# Patient Record
Sex: MALE | Race: WHITE | URBAN - METROPOLITAN AREA
[De-identification: names, ages, dates, MRNs, and addresses within clinical notes are randomized per-mention and may not be internally consistent; named-entity substitution may affect disease eponyms.]

---

## 2021-10-05 ENCOUNTER — WEB ENCOUNTER (OUTPATIENT)
Dept: URBAN - METROPOLITAN AREA CLINIC 98 | Facility: CLINIC | Age: 51
End: 2021-10-05

## 2021-10-05 ENCOUNTER — OFFICE VISIT (OUTPATIENT)
Dept: URBAN - METROPOLITAN AREA CLINIC 98 | Facility: CLINIC | Age: 51
End: 2021-10-05
Payer: COMMERCIAL

## 2021-10-05 DIAGNOSIS — R63.4 WEIGHT LOSS: ICD-10-CM

## 2021-10-05 DIAGNOSIS — Z12.11 ENCOUNTER FOR SCREENING FOR MALIGNANT NEOPLASM OF COLON: ICD-10-CM

## 2021-10-05 DIAGNOSIS — K76.0 HEPATIC STEATOSIS: ICD-10-CM

## 2021-10-05 DIAGNOSIS — R19.7 DIARRHEA, UNSPECIFIED TYPE: ICD-10-CM

## 2021-10-05 PROCEDURE — 99204 OFFICE O/P NEW MOD 45 MIN: CPT | Performed by: INTERNAL MEDICINE

## 2021-10-05 RX ORDER — OMEPRAZOLE 40 MG/1
1 CAPSULE 30 MINUTES BEFORE MORNING MEAL CAPSULE, DELAYED RELEASE ORAL ONCE A DAY
Status: ACTIVE | COMMUNITY

## 2021-10-05 RX ORDER — LOSARTAN POTASSIUM 50 MG/1
1 TABLET TABLET ORAL ONCE A DAY
Status: ACTIVE | COMMUNITY

## 2021-10-05 RX ORDER — PANTOPRAZOLE SODIUM 40 MG/1
TABLET, DELAYED RELEASE ORAL
Qty: 0 | Refills: 0 | Status: DISCONTINUED | COMMUNITY
Start: 1900-01-01

## 2021-10-05 NOTE — HPI-TODAY'S VISIT:
last seen by myself in 2016 after [facility] stay for hematemesis. at the time, EGD w severe esophagitis.  US with hepatic steatosis: admitted to heavy etoh use, prior cocaine use.   lost 60 lbs since December.  goes through 2-3 rolls of toilet paper  due to diarrhea.   started 1 yr ago.  testing for COVID negative.   been to PCP but workup negative.   no pain - just "gurgles"  . still w Etoh intake - "Not enough" he says.  daily - beer  still working at bar.   no hematemesis.

## 2021-10-06 LAB
A/G RATIO: 1.3
ALBUMIN: 4.2
ALKALINE PHOSPHATASE: 135
ALT (SGPT): 58
AST (SGOT): 133
BASO (ABSOLUTE): 0.1
BASOS: 1
BILIRUBIN, TOTAL: 0.9
BUN/CREATININE RATIO: 5
BUN: 4
CALCIUM: 9.3
CARBON DIOXIDE, TOTAL: 25
CHLORIDE: 99
CREATININE: 0.82
EGFR IF AFRICN AM: 119
EGFR IF NONAFRICN AM: 103
EOS (ABSOLUTE): 0.2
EOS: 4
GGT: 356
GLOBULIN, TOTAL: 3.3
GLUCOSE: 80
HEMATOCRIT: 43.4
HEMATOLOGY COMMENTS:: (no result)
HEMOGLOBIN: 14.6
IMMATURE CELLS: (no result)
IMMATURE GRANS (ABS): 0
IMMATURE GRANULOCYTES: 0
LYMPHS (ABSOLUTE): 1.1
LYMPHS: 20
MCH: 33.6
MCHC: 33.6
MCV: 100
MONOCYTES(ABSOLUTE): 0.7
MONOCYTES: 12
NEUTROPHILS (ABSOLUTE): 3.6
NEUTROPHILS: 63
NRBC: (no result)
PLATELETS: 232
POTASSIUM: 4.1
PROTEIN, TOTAL: 7.5
RBC: 4.35
RDW: 15
SODIUM: 139
WBC: 5.7

## 2021-10-29 ENCOUNTER — LAB OUTSIDE AN ENCOUNTER (OUTPATIENT)
Dept: URBAN - METROPOLITAN AREA CLINIC 98 | Facility: CLINIC | Age: 51
End: 2021-10-29

## 2021-10-29 ENCOUNTER — TELEPHONE ENCOUNTER (OUTPATIENT)
Dept: URBAN - METROPOLITAN AREA CLINIC 98 | Facility: CLINIC | Age: 51
End: 2021-10-29

## 2021-11-15 ENCOUNTER — TELEPHONE ENCOUNTER (OUTPATIENT)
Dept: URBAN - METROPOLITAN AREA CLINIC 98 | Facility: CLINIC | Age: 51
End: 2021-11-15

## 2021-11-29 ENCOUNTER — OFFICE VISIT (OUTPATIENT)
Dept: URBAN - METROPOLITAN AREA SURGERY CENTER 18 | Facility: SURGERY CENTER | Age: 51
End: 2021-11-29
Payer: COMMERCIAL

## 2021-11-29 ENCOUNTER — CLAIMS CREATED FROM THE CLAIM WINDOW (OUTPATIENT)
Dept: URBAN - METROPOLITAN AREA CLINIC 4 | Facility: CLINIC | Age: 51
End: 2021-11-29
Payer: COMMERCIAL

## 2021-11-29 DIAGNOSIS — D12.8 BENIGN NEOPLASM OF RECTUM: ICD-10-CM

## 2021-11-29 DIAGNOSIS — D12.3 BENIGN NEOPLASM OF TRANSVERSE COLON: ICD-10-CM

## 2021-11-29 DIAGNOSIS — K31.89 DEFORMED PYLORUS, ACQUIRED: ICD-10-CM

## 2021-11-29 DIAGNOSIS — K29.80 ACUTE DUODENITIS: ICD-10-CM

## 2021-11-29 DIAGNOSIS — D12.3 ADENOMA OF TRANSVERSE COLON: ICD-10-CM

## 2021-11-29 DIAGNOSIS — K29.81 DUODENITIS WITH BLEEDING: ICD-10-CM

## 2021-11-29 DIAGNOSIS — D12.5 ADENOMA OF SIGMOID COLON: ICD-10-CM

## 2021-11-29 DIAGNOSIS — K31.89 ACQUIRED DEFORMITY OF DUODENUM: ICD-10-CM

## 2021-11-29 DIAGNOSIS — K22.10 ULCER OF ESOPHAGUS WITHOUT BLEEDING: ICD-10-CM

## 2021-11-29 DIAGNOSIS — K21.9 GASTRO-ESOPHAGEAL REFLUX DISEASE WITHOUT ESOPHAGITIS: ICD-10-CM

## 2021-11-29 DIAGNOSIS — D12.8 ADENOMATOUS POLYP OF RECTUM: ICD-10-CM

## 2021-11-29 DIAGNOSIS — K22.10 BARRETT'S ESOPHAGEAL ULCERATION: ICD-10-CM

## 2021-11-29 DIAGNOSIS — D12.5 BENIGN NEOPLASM OF SIGMOID COLON: ICD-10-CM

## 2021-11-29 PROCEDURE — 45385 COLONOSCOPY W/LESION REMOVAL: CPT | Performed by: INTERNAL MEDICINE

## 2021-11-29 PROCEDURE — 88305 TISSUE EXAM BY PATHOLOGIST: CPT | Performed by: PATHOLOGY

## 2021-11-29 PROCEDURE — 45380 COLONOSCOPY AND BIOPSY: CPT | Performed by: INTERNAL MEDICINE

## 2021-11-29 PROCEDURE — G8907 PT DOC NO EVENTS ON DISCHARG: HCPCS | Performed by: INTERNAL MEDICINE

## 2021-11-29 PROCEDURE — 43239 EGD BIOPSY SINGLE/MULTIPLE: CPT | Performed by: INTERNAL MEDICINE

## 2021-11-29 PROCEDURE — 88342 IMHCHEM/IMCYTCHM 1ST ANTB: CPT | Performed by: PATHOLOGY

## 2021-11-29 PROCEDURE — 88312 SPECIAL STAINS GROUP 1: CPT | Performed by: PATHOLOGY

## 2021-11-29 PROCEDURE — 88341 IMHCHEM/IMCYTCHM EA ADD ANTB: CPT | Performed by: PATHOLOGY

## 2021-11-29 RX ORDER — LOSARTAN POTASSIUM 50 MG/1
1 TABLET TABLET ORAL ONCE A DAY
Status: ACTIVE | COMMUNITY

## 2021-11-29 RX ORDER — OMEPRAZOLE 40 MG/1
1 CAPSULE 30 MINUTES BEFORE MORNING MEAL CAPSULE, DELAYED RELEASE ORAL ONCE A DAY
Status: ACTIVE | COMMUNITY

## 2021-12-08 ENCOUNTER — TELEPHONE ENCOUNTER (OUTPATIENT)
Dept: URBAN - METROPOLITAN AREA CLINIC 98 | Facility: CLINIC | Age: 51
End: 2021-12-08

## 2021-12-08 RX ORDER — OMEPRAZOLE 40 MG/1
1 CAPSULE 30 MINUTES BEFORE MORNING MEAL CAPSULE, DELAYED RELEASE ORAL BID
Qty: 180 | Refills: 1

## 2021-12-08 RX ORDER — SUCRALFATE 1 G
1 TABLET ON AN EMPTY STOMACH TABLET ORAL TWICE A DAY
Qty: 180 TABLET | Refills: 0 | OUTPATIENT
Start: 2021-12-08 | End: 2022-03-08

## 2022-03-14 ENCOUNTER — TELEPHONE ENCOUNTER (OUTPATIENT)
Dept: URBAN - METROPOLITAN AREA CLINIC 98 | Facility: CLINIC | Age: 52
End: 2022-03-14

## 2022-03-17 LAB
A/G RATIO: 1.4
ALBUMIN: 4.1
ALKALINE PHOSPHATASE: 101
ALT (SGPT): 52
AST (SGOT): 58
BASO (ABSOLUTE): 0.1
BASOS: 1
BILIRUBIN, TOTAL: 1.2
BUN/CREATININE RATIO: 9
BUN: 7
CALCIUM: 9.9
CARBON DIOXIDE, TOTAL: 26
CHLORIDE: 98
CREATININE: 0.82
EGFR: 106
EOS (ABSOLUTE): 0.2
EOS: 4
FERRITIN, SERUM: 93
GLOBULIN, TOTAL: 2.9
GLUCOSE: 102
HEMATOCRIT: 43.9
HEMATOLOGY COMMENTS:: (no result)
HEMOGLOBIN: 14.7
IMMATURE CELLS: (no result)
IMMATURE GRANS (ABS): 0
IMMATURE GRANULOCYTES: 0
IRON BIND.CAP.(TIBC): <357
IRON SATURATION: >95
IRON: 340
LYMPHS (ABSOLUTE): 1
LYMPHS: 15
MCH: 32.4
MCHC: 33.5
MCV: 97
MONOCYTES(ABSOLUTE): 0.6
MONOCYTES: 9
NEUTROPHILS (ABSOLUTE): 4.9
NEUTROPHILS: 71
NRBC: (no result)
PLATELETS: 356
POTASSIUM: 4.9
PROTEIN, TOTAL: 7
RBC: 4.54
RDW: 16.9
SODIUM: 139
UIBC: <17
WBC: 6.8

## 2022-03-23 ENCOUNTER — TELEPHONE ENCOUNTER (OUTPATIENT)
Dept: URBAN - METROPOLITAN AREA CLINIC 98 | Facility: CLINIC | Age: 52
End: 2022-03-23

## 2022-04-30 ENCOUNTER — ERX REFILL RESPONSE (OUTPATIENT)
Dept: URBAN - METROPOLITAN AREA CLINIC 98 | Facility: CLINIC | Age: 52
End: 2022-04-30

## 2022-04-30 RX ORDER — SUCRALFATE 1 G/1
TAKE ONE TABLET BY MOUTH TWICE A DAY ON AN EMPTY STOMACH TABLET ORAL
Qty: 180 TABLET | Refills: 1 | OUTPATIENT

## 2022-05-03 ENCOUNTER — OFFICE VISIT (OUTPATIENT)
Dept: URBAN - METROPOLITAN AREA CLINIC 97 | Facility: CLINIC | Age: 52
End: 2022-05-03

## 2022-06-02 ENCOUNTER — OFFICE VISIT (OUTPATIENT)
Dept: URBAN - METROPOLITAN AREA CLINIC 98 | Facility: CLINIC | Age: 52
End: 2022-06-02
Payer: COMMERCIAL

## 2022-06-02 ENCOUNTER — LAB OUTSIDE AN ENCOUNTER (OUTPATIENT)
Dept: URBAN - METROPOLITAN AREA CLINIC 98 | Facility: CLINIC | Age: 52
End: 2022-06-02

## 2022-06-02 VITALS
BODY MASS INDEX: 26.95 KG/M2 | HEART RATE: 86 BPM | SYSTOLIC BLOOD PRESSURE: 110 MMHG | WEIGHT: 199 LBS | HEIGHT: 72 IN | TEMPERATURE: 98.4 F | DIASTOLIC BLOOD PRESSURE: 72 MMHG

## 2022-06-02 DIAGNOSIS — R63.4 WEIGHT LOSS: ICD-10-CM

## 2022-06-02 DIAGNOSIS — K76.0 HEPATIC STEATOSIS: ICD-10-CM

## 2022-06-02 DIAGNOSIS — R19.7 DIARRHEA, UNSPECIFIED TYPE: ICD-10-CM

## 2022-06-02 DIAGNOSIS — R79.89 ELEVATED FERRITIN: ICD-10-CM

## 2022-06-02 DIAGNOSIS — R13.19 ESOPHAGEAL DYSPHAGIA: ICD-10-CM

## 2022-06-02 DIAGNOSIS — K22.10 EROSIVE ESOPHAGITIS: ICD-10-CM

## 2022-06-02 DIAGNOSIS — D12.8 TUBULOVILLOUS ADENOMA POLYP OF RECTUM: ICD-10-CM

## 2022-06-02 PROBLEM — 197321007: Status: ACTIVE | Noted: 2021-10-05

## 2022-06-02 PROCEDURE — 99214 OFFICE O/P EST MOD 30 MIN: CPT | Performed by: INTERNAL MEDICINE

## 2022-06-02 RX ORDER — SUCRALFATE 1 G/1
TAKE ONE TABLET BY MOUTH TWICE A DAY ON AN EMPTY STOMACH TABLET ORAL
Qty: 180 TABLET | Refills: 1 | Status: ACTIVE | COMMUNITY

## 2022-06-02 RX ORDER — OMEPRAZOLE 40 MG/1
1 CAPSULE 30 MINUTES BEFORE MORNING MEAL CAPSULE, DELAYED RELEASE ORAL BID
Qty: 180 | Refills: 1 | Status: ACTIVE | COMMUNITY

## 2022-06-02 RX ORDER — LOSARTAN POTASSIUM 50 MG/1
1 TABLET TABLET ORAL ONCE A DAY
Status: ACTIVE | COMMUNITY

## 2022-06-02 NOTE — HPI-OTHER HISTORIES
diarrhea is better - twice a week  he thinks he is done but then will have more stool  doing lactaid now  trying to change diet  as well feb and march had BRB had enough bleeding to run down leg .  stopped coughing as much  when he eats too fast, things get stuck  food is hard to get down .

## 2022-06-07 LAB
A/G RATIO: 1.8
ALBUMIN: 4.6
ALKALINE PHOSPHATASE: 101
ALT (SGPT): 16
AST (SGOT): 23
BASO (ABSOLUTE): 0.1
BASOS: 1
BILIRUBIN, TOTAL: 0.6
BUN/CREATININE RATIO: 11
BUN: 10
CALCIUM: 10.2
CARBON DIOXIDE, TOTAL: 24
CHLORIDE: 100
CREATININE: 0.91
EGFR: 102
EOS (ABSOLUTE): 0.2
EOS: 3
FERRITIN, SERUM: 49
GGT: 122
GLOBULIN, TOTAL: 2.6
GLUCOSE: 98
HEMATOCRIT: 46.5
HEMATOLOGY COMMENTS:: (no result)
HEMOGLOBIN: 15.6
HEREDITARY  HEMOCHROMATOSIS: (no result)
IMMATURE CELLS: (no result)
IMMATURE GRANS (ABS): 0
IMMATURE GRANULOCYTES: 1
IRON BIND.CAP.(TIBC): 468
IRON SATURATION: 21
IRON: 98
LYMPHS (ABSOLUTE): 1.7
LYMPHS: 22
MCH: 32.8
MCHC: 33.5
MCV: 98
MONOCYTES(ABSOLUTE): 0.8
MONOCYTES: 10
NEUTROPHILS (ABSOLUTE): 4.9
NEUTROPHILS: 63
NRBC: (no result)
PLATELETS: 376
POTASSIUM: 5
PROTEIN, TOTAL: 7.2
RBC: 4.75
RDW: 12.7
SODIUM: 141
UIBC: 370
WBC: 7.7

## 2022-06-10 ENCOUNTER — TELEPHONE ENCOUNTER (OUTPATIENT)
Dept: URBAN - METROPOLITAN AREA CLINIC 98 | Facility: CLINIC | Age: 52
End: 2022-06-10

## 2022-06-14 PROBLEM — 40719004: Status: ACTIVE | Noted: 2022-06-02

## 2022-07-25 ENCOUNTER — WEB ENCOUNTER (OUTPATIENT)
Dept: URBAN - METROPOLITAN AREA SURGERY CENTER 18 | Facility: SURGERY CENTER | Age: 52
End: 2022-07-25

## 2022-07-25 ENCOUNTER — OFFICE VISIT (OUTPATIENT)
Dept: URBAN - METROPOLITAN AREA SURGERY CENTER 18 | Facility: SURGERY CENTER | Age: 52
End: 2022-07-25
Payer: COMMERCIAL

## 2022-07-25 ENCOUNTER — CLAIMS CREATED FROM THE CLAIM WINDOW (OUTPATIENT)
Dept: URBAN - METROPOLITAN AREA CLINIC 4 | Facility: CLINIC | Age: 52
End: 2022-07-25
Payer: COMMERCIAL

## 2022-07-25 DIAGNOSIS — K21.9 ACID REFLUX: ICD-10-CM

## 2022-07-25 DIAGNOSIS — K29.70 GASTRITIS, UNSPECIFIED, WITHOUT BLEEDING: ICD-10-CM

## 2022-07-25 DIAGNOSIS — K31.89 ACQUIRED DEFORMITY OF DUODENUM: ICD-10-CM

## 2022-07-25 DIAGNOSIS — K21.9 GASTRO-ESOPHAGEAL REFLUX DISEASE WITHOUT ESOPHAGITIS: ICD-10-CM

## 2022-07-25 PROCEDURE — 43239 EGD BIOPSY SINGLE/MULTIPLE: CPT | Performed by: INTERNAL MEDICINE

## 2022-07-25 PROCEDURE — G8907 PT DOC NO EVENTS ON DISCHARG: HCPCS | Performed by: INTERNAL MEDICINE

## 2022-07-25 PROCEDURE — 88312 SPECIAL STAINS GROUP 1: CPT | Performed by: PATHOLOGY

## 2022-07-25 PROCEDURE — 88305 TISSUE EXAM BY PATHOLOGIST: CPT | Performed by: PATHOLOGY

## 2022-07-25 RX ORDER — LOSARTAN POTASSIUM 50 MG/1
1 TABLET TABLET ORAL ONCE A DAY
Status: ACTIVE | COMMUNITY

## 2022-07-25 RX ORDER — OMEPRAZOLE 40 MG/1
1 CAPSULE 30 MINUTES BEFORE MORNING MEAL CAPSULE, DELAYED RELEASE ORAL BID
Qty: 180 | Refills: 1 | Status: ACTIVE | COMMUNITY

## 2022-07-25 RX ORDER — SUCRALFATE 1 G/1
TAKE ONE TABLET BY MOUTH TWICE A DAY ON AN EMPTY STOMACH TABLET ORAL
Qty: 180 TABLET | Refills: 1 | Status: ACTIVE | COMMUNITY

## 2022-08-11 ENCOUNTER — TELEPHONE ENCOUNTER (OUTPATIENT)
Dept: URBAN - METROPOLITAN AREA CLINIC 98 | Facility: CLINIC | Age: 52
End: 2022-08-11

## 2022-09-20 ENCOUNTER — ERX REFILL RESPONSE (OUTPATIENT)
Dept: URBAN - METROPOLITAN AREA CLINIC 98 | Facility: CLINIC | Age: 52
End: 2022-09-20

## 2022-09-20 RX ORDER — SUCRALFATE 1 G/1
TAKE ONE TABLET BY MOUTH TWICE A DAY ON AN EMPTY STOMACH FOR 90 DAYS TABLET ORAL
Qty: 180 TABLET | Refills: 0 | OUTPATIENT

## 2022-09-20 RX ORDER — SUCRALFATE 1 G/1
TAKE ONE TABLET BY MOUTH TWICE A DAY ON AN EMPTY STOMACH TABLET ORAL
Qty: 180 TABLET | Refills: 1 | OUTPATIENT

## 2022-11-02 ENCOUNTER — ERX REFILL RESPONSE (OUTPATIENT)
Dept: URBAN - METROPOLITAN AREA CLINIC 98 | Facility: CLINIC | Age: 52
End: 2022-11-02

## 2022-11-02 RX ORDER — OMEPRAZOLE 40 MG/1
TAKE ONE CAPSULE BY MOUTH TWICE A DAY 30 MINUTES BEFORE MEAL CAPSULE, DELAYED RELEASE ORAL
Qty: 180 CAPSULE | Refills: 1 | OUTPATIENT

## 2022-11-02 RX ORDER — OMEPRAZOLE 40 MG/1
1 CAPSULE 30 MINUTES BEFORE MORNING MEAL CAPSULE, DELAYED RELEASE ORAL BID
Qty: 180 | Refills: 1 | OUTPATIENT

## 2023-04-13 ENCOUNTER — TELEPHONE ENCOUNTER (OUTPATIENT)
Dept: URBAN - METROPOLITAN AREA CLINIC 6 | Facility: CLINIC | Age: 53
End: 2023-04-13

## 2023-04-13 RX ORDER — SUCRALFATE 1 G/1
TAKE ONE TABLET BY MOUTH TWICE A DAY ON AN EMPTY STOMACH FOR 90 DAYS TABLET ORAL
Qty: 180 TABLET | Refills: 0
End: 2023-05-13

## 2023-12-19 ENCOUNTER — TELEPHONE ENCOUNTER (OUTPATIENT)
Dept: URBAN - METROPOLITAN AREA CLINIC 98 | Facility: CLINIC | Age: 53
End: 2023-12-19

## 2023-12-19 RX ORDER — SUCRALFATE 1 G/1
TAKE ONE TABLET BY MOUTH TWICE A DAY ON AN EMPTY STOMACH FOR 90 DAYS TABLET ORAL
Qty: 180 TABLET | Refills: 0
End: 2024-03-18

## 2024-01-22 ENCOUNTER — LAB OUTSIDE AN ENCOUNTER (OUTPATIENT)
Dept: URBAN - METROPOLITAN AREA CLINIC 98 | Facility: CLINIC | Age: 54
End: 2024-01-22

## 2024-01-22 ENCOUNTER — OFFICE VISIT (OUTPATIENT)
Dept: URBAN - METROPOLITAN AREA CLINIC 98 | Facility: CLINIC | Age: 54
End: 2024-01-22
Payer: COMMERCIAL

## 2024-01-22 VITALS
BODY MASS INDEX: 29.07 KG/M2 | HEIGHT: 72 IN | DIASTOLIC BLOOD PRESSURE: 70 MMHG | SYSTOLIC BLOOD PRESSURE: 107 MMHG | TEMPERATURE: 97 F | WEIGHT: 214.6 LBS | HEART RATE: 88 BPM

## 2024-01-22 DIAGNOSIS — R13.19 ESOPHAGEAL DYSPHAGIA: ICD-10-CM

## 2024-01-22 DIAGNOSIS — K76.0 HEPATIC STEATOSIS: ICD-10-CM

## 2024-01-22 DIAGNOSIS — D12.8 TUBULOVILLOUS ADENOMA POLYP OF RECTUM: ICD-10-CM

## 2024-01-22 DIAGNOSIS — R63.4 WEIGHT LOSS: ICD-10-CM

## 2024-01-22 PROCEDURE — 99214 OFFICE O/P EST MOD 30 MIN: CPT | Performed by: INTERNAL MEDICINE

## 2024-01-22 RX ORDER — LOSARTAN POTASSIUM 50 MG/1
1 TABLET TABLET ORAL ONCE A DAY
Status: ON HOLD | COMMUNITY

## 2024-01-22 RX ORDER — CETIRIZINE HYDROCHLORIDE 10 MG/1
1 TABLET TABLET, FILM COATED ORAL ONCE A DAY
Status: ACTIVE | COMMUNITY

## 2024-01-22 RX ORDER — OMEPRAZOLE 40 MG/1
TAKE ONE CAPSULE BY MOUTH TWICE A DAY 30 MINUTES BEFORE MEAL CAPSULE, DELAYED RELEASE ORAL
Qty: 180 CAPSULE | Refills: 1 | Status: ACTIVE | COMMUNITY

## 2024-01-22 RX ORDER — SUCRALFATE 1 G/1
TAKE ONE TABLET BY MOUTH TWICE A DAY ON AN EMPTY STOMACH FOR 90 DAYS TABLET ORAL
Qty: 180 TABLET | Refills: 0 | Status: ACTIVE | COMMUNITY
End: 2024-03-18

## 2024-01-22 NOTE — HPI-TODAY'S VISIT:
here with his wife : was hospitalized at [facility] in Sept 2023 - dx with PNA possible aspiration had a lot of coughing saw Pulm Dr Giovanni Junior; breathing test was "off" Ba swallow scheduled  Cardiology Dr Balderas did TTE "ok" and CT Cardiac nl  . breathing now is not great - feels like he is chasing his breath  sit up - cough  sleeps on a wedge  has inhalers  . eating and drinking coughs not keeping anything down . on omeprazole 40mg bid and sucralfate 1gm bid  no trouble swallowing pills  solid food makes him cough  although he can eat a [employer] cheeseburger well  Saturday and a few days before - he stopped being able to take liquids and sweating . 3 beers a day  since Jan 1  previously 4-5 beers / day he is willing to think about cutting down more on alcohol intake

## 2024-01-23 LAB
A/G RATIO: 1.6
ALBUMIN: 4.7
ALKALINE PHOSPHATASE: 99
ALT (SGPT): 24
AST (SGOT): 41
BASO (ABSOLUTE): 0.1
BASOS: 1
BILIRUBIN, TOTAL: 2.1
BUN/CREATININE RATIO: 6
BUN: 5
CALCIUM: 10
CARBON DIOXIDE, TOTAL: 25
CHLORIDE: 96
CREATININE: 0.88
EGFR: 103
EOS (ABSOLUTE): 0.4
EOS: 4
GGT: 102
GLOBULIN, TOTAL: 2.9
GLUCOSE: 104
HEMATOCRIT: 47.9
HEMATOLOGY COMMENTS:: (no result)
HEMOGLOBIN: 16.2
IMMATURE CELLS: (no result)
IMMATURE GRANS (ABS): 0
IMMATURE GRANULOCYTES: 0
LIPASE: 31
LYMPHS (ABSOLUTE): 1.8
LYMPHS: 22
MCH: 33.7
MCHC: 33.8
MCV: 100
MONOCYTES(ABSOLUTE): 0.7
MONOCYTES: 9
NEUTROPHILS (ABSOLUTE): 5.1
NEUTROPHILS: 64
NRBC: (no result)
PLATELETS: 334
POTASSIUM: 4.3
PROTEIN, TOTAL: 7.6
RBC: 4.81
RDW: 12.5
SODIUM: 136
WBC: 8

## 2024-03-05 ENCOUNTER — OV EP (OUTPATIENT)
Dept: URBAN - METROPOLITAN AREA CLINIC 98 | Facility: CLINIC | Age: 54
End: 2024-03-05

## 2024-03-25 ENCOUNTER — OV EP (OUTPATIENT)
Dept: URBAN - METROPOLITAN AREA CLINIC 98 | Facility: CLINIC | Age: 54
End: 2024-03-25

## 2024-05-07 ENCOUNTER — LAB OUTSIDE AN ENCOUNTER (OUTPATIENT)
Dept: URBAN - METROPOLITAN AREA CLINIC 98 | Facility: CLINIC | Age: 54
End: 2024-05-07

## 2024-05-07 ENCOUNTER — OFFICE VISIT (OUTPATIENT)
Dept: URBAN - METROPOLITAN AREA CLINIC 98 | Facility: CLINIC | Age: 54
End: 2024-05-07
Payer: COMMERCIAL

## 2024-05-07 ENCOUNTER — TELEPHONE ENCOUNTER (OUTPATIENT)
Dept: URBAN - METROPOLITAN AREA CLINIC 98 | Facility: CLINIC | Age: 54
End: 2024-05-07

## 2024-05-07 ENCOUNTER — DASHBOARD ENCOUNTERS (OUTPATIENT)
Age: 54
End: 2024-05-07

## 2024-05-07 VITALS
DIASTOLIC BLOOD PRESSURE: 80 MMHG | TEMPERATURE: 98.2 F | WEIGHT: 209 LBS | SYSTOLIC BLOOD PRESSURE: 142 MMHG | HEART RATE: 86 BPM | BODY MASS INDEX: 28.31 KG/M2 | OXYGEN SATURATION: 93 % | HEIGHT: 72 IN

## 2024-05-07 DIAGNOSIS — K22.10 EROSIVE ESOPHAGITIS: ICD-10-CM

## 2024-05-07 DIAGNOSIS — R13.19 ESOPHAGEAL DYSPHAGIA: ICD-10-CM

## 2024-05-07 DIAGNOSIS — R63.4 WEIGHT LOSS: ICD-10-CM

## 2024-05-07 DIAGNOSIS — D12.8 TUBULOVILLOUS ADENOMA POLYP OF RECTUM: ICD-10-CM

## 2024-05-07 PROCEDURE — 99215 OFFICE O/P EST HI 40 MIN: CPT

## 2024-05-07 RX ORDER — LOSARTAN POTASSIUM 50 MG/1
1 TABLET TABLET ORAL ONCE A DAY
Status: ON HOLD | COMMUNITY

## 2024-05-07 RX ORDER — CETIRIZINE HYDROCHLORIDE 10 MG/1
1 TABLET TABLET, FILM COATED ORAL ONCE A DAY
Status: ACTIVE | COMMUNITY

## 2024-05-07 RX ORDER — OMEPRAZOLE 40 MG/1
TAKE ONE CAPSULE BY MOUTH TWICE A DAY 30 MINUTES BEFORE MEAL CAPSULE, DELAYED RELEASE ORAL
Qty: 180 CAPSULE | Refills: 1 | Status: ACTIVE | COMMUNITY

## 2024-05-09 ENCOUNTER — TELEPHONE ENCOUNTER (OUTPATIENT)
Dept: URBAN - METROPOLITAN AREA CLINIC 98 | Facility: CLINIC | Age: 54
End: 2024-05-09

## 2024-05-23 ENCOUNTER — LAB OUTSIDE AN ENCOUNTER (OUTPATIENT)
Dept: URBAN - METROPOLITAN AREA CLINIC 98 | Facility: CLINIC | Age: 54
End: 2024-05-23

## 2024-05-24 ENCOUNTER — LAB OUTSIDE AN ENCOUNTER (OUTPATIENT)
Dept: URBAN - METROPOLITAN AREA CLINIC 98 | Facility: CLINIC | Age: 54
End: 2024-05-24

## 2024-05-28 ENCOUNTER — TELEPHONE ENCOUNTER (OUTPATIENT)
Dept: URBAN - METROPOLITAN AREA CLINIC 98 | Facility: CLINIC | Age: 54
End: 2024-05-28

## 2024-06-04 ENCOUNTER — OFFICE VISIT (OUTPATIENT)
Dept: URBAN - METROPOLITAN AREA CLINIC 98 | Facility: CLINIC | Age: 54
End: 2024-06-04
Payer: COMMERCIAL

## 2024-06-04 DIAGNOSIS — R13.19 ESOPHAGEAL DYSPHAGIA: ICD-10-CM

## 2024-06-04 DIAGNOSIS — D12.8 TUBULOVILLOUS ADENOMA POLYP OF RECTUM: ICD-10-CM

## 2024-06-04 DIAGNOSIS — K22.10 EROSIVE ESOPHAGITIS: ICD-10-CM

## 2024-06-04 DIAGNOSIS — R63.4 WEIGHT LOSS: ICD-10-CM

## 2024-06-04 PROCEDURE — 99214 OFFICE O/P EST MOD 30 MIN: CPT

## 2024-06-04 RX ORDER — OMEPRAZOLE 40 MG/1
TAKE ONE CAPSULE BY MOUTH TWICE A DAY 30 MINUTES BEFORE MEAL CAPSULE, DELAYED RELEASE ORAL
Qty: 180 CAPSULE | Refills: 1 | Status: ACTIVE | COMMUNITY

## 2024-06-04 RX ORDER — CETIRIZINE HYDROCHLORIDE 10 MG/1
1 TABLET TABLET, FILM COATED ORAL ONCE A DAY
Status: ACTIVE | COMMUNITY

## 2024-06-04 RX ORDER — LOSARTAN POTASSIUM 50 MG/1
1 TABLET TABLET ORAL ONCE A DAY
Status: ON HOLD | COMMUNITY

## 2024-06-04 NOTE — HPI-OTHER HISTORIES
05/24/2024 DOUBLE CONTRAST BARIUM SWALLOW         HISTORY:Difficulty swallowing solids. Esophageal dysphagia. Coughing.        Difficulty breathing.         FLUOROSCOPY TIME:1.5 minutes, 41 images         TECHNIQUE AND FINDINGS:         Thick barium was administered for the pharyngeal phase of the        examination. Negative for strictures or diverticula.         Effervescent granules and thick barium were administered for the double        contrast phase of the examination. The esophagus is normal in caliber        and contour. No mucosal irregularity.         Thin barium was administered for the motility phase of the examination.        Primary stripping wave was complete. No tertiary contractions. Negative        for spontaneous and induced gastroesophageal reflux. Negative for        hiatal hernia.         A 13 mm barium pill passed easily from the esophagus into the stomach.         IMPRESSION:         Negative exam.

## 2024-06-04 NOTE — HPI-TODAY'S VISIT:
Visit 1/22/24- Dr. Villalobos here with his wife : was hospitalized at [facility] in Sept 2023 - dx with PNA possible aspiration had a lot of coughing saw Pulm Dr Giovanni Junior; breathing test was "off" Ba swallow scheduled  Cardiology Dr Balderas did TTE "ok" and CT Cardiac nl  . breathing now is not great - feels like he is chasing his breath  sit up - cough  sleeps on a wedge  has inhalers  . eating and drinking coughs not keeping anything down . on omeprazole 40mg bid and sucralfate 1gm bid  no trouble swallowing pills  solid food makes him cough  although he can eat a [employer] cheeseburger well  Saturday and a few days before - he stopped being able to take liquids and sweating . 3 beers a day  since Jan 1  previously 4-5 beers / day he is willing to think about cutting down more on alcohol intake  Visit 5/7/24- Kamilah Birmingham NP - Here with complaints of "food gets stuck when eating" - Aleyda mother here for office visit today - Last visit with Dr. Villalobos 1/22/24 - Since hospitalized for pneumonia in 9/2023- has been having "increased cough" and SOB - Dx. with COPD- after breathing test Dr. Darvin Davila in 2/2024 - ENT last week. Dr. Ray Beaver to evaluate for cough - Barium Swallow 2-3 months ago ordered by pulmonologist- Results not available - Started on Gabapentin by ENT and referred to GI - All solid foods get stuck when eating - Always short of breath and "breathing continues to get worse" - Nausea and vomiting with all foods; after being stuck - Taking omeprazole 40 BID and sucralfate 1 mg BID - Visit 6/5/24- Kamilah Birmingham NP - Here to follow-up with complaints of dysphagia - Awaiting pulmonary clearance to complete EGD - pt. unable to lay flat - Increased cough and SOB - Barium swallow tablet- negative- pt. not able to lay flat - Taking omeprazole 40 bid, sucralfate bid

## 2024-06-17 ENCOUNTER — TELEPHONE ENCOUNTER (OUTPATIENT)
Dept: URBAN - METROPOLITAN AREA CLINIC 98 | Facility: CLINIC | Age: 54
End: 2024-06-17

## 2024-06-26 ENCOUNTER — OFFICE VISIT (OUTPATIENT)
Dept: URBAN - METROPOLITAN AREA CLINIC 98 | Facility: CLINIC | Age: 54
End: 2024-06-26

## 2024-06-26 VITALS
WEIGHT: 206.2 LBS | TEMPERATURE: 97.2 F | BODY MASS INDEX: 27.93 KG/M2 | HEIGHT: 72 IN | SYSTOLIC BLOOD PRESSURE: 113 MMHG | HEART RATE: 79 BPM | DIASTOLIC BLOOD PRESSURE: 90 MMHG

## 2024-06-26 RX ORDER — CETIRIZINE HYDROCHLORIDE 10 MG/1
1 TABLET TABLET, FILM COATED ORAL ONCE A DAY
Status: ACTIVE | COMMUNITY

## 2024-06-26 RX ORDER — OMEPRAZOLE 40 MG/1
TAKE ONE CAPSULE BY MOUTH TWICE A DAY 30 MINUTES BEFORE MEAL CAPSULE, DELAYED RELEASE ORAL
Qty: 180 CAPSULE | Refills: 1 | Status: ACTIVE | COMMUNITY

## 2024-06-26 RX ORDER — LOSARTAN POTASSIUM 50 MG/1
1 TABLET TABLET ORAL ONCE A DAY
Status: ON HOLD | COMMUNITY

## 2024-06-26 NOTE — HPI-TODAY'S VISIT:
Visit 1/22/24- Dr. Villalobos here with his wife : was hospitalized at [facility] in Sept 2023 - dx with PNA possible aspiration had a lot of coughing saw Pulm Dr Giovanni Junior; breathing test was "off" Ba swallow scheduled  Cardiology Dr Balderas did TTE "ok" and CT Cardiac nl  . breathing now is not great - feels like he is chasing his breath  sit up - cough  sleeps on a wedge  has inhalers  . eating and drinking coughs not keeping anything down . on omeprazole 40mg bid and sucralfate 1gm bid  no trouble swallowing pills  solid food makes him cough  although he can eat a [employer] cheeseburger well  Saturday and a few days before - he stopped being able to take liquids and sweating . 3 beers a day  since Jan 1  previously 4-5 beers / day he is willing to think about cutting down more on alcohol intake  Visit 5/7/24- Kamilah Birmingham NP - Here with complaints of "food gets stuck when eating" - Aleyda mother here for office visit today - Last visit with Dr. Villalobos 1/22/24 - Since hospitalized for pneumonia in 9/2023- has been having "increased cough" and SOB - Dx. with COPD- after breathing test Dr. Darvin Davila in 2/2024 - ENT last week. Dr. Ray Beaver to evaluate for cough - Barium Swallow 2-3 months ago ordered by pulmonologist- Results not available - Started on Gabapentin by ENT and referred to GI - All solid foods get stuck when eating - Always short of breath and "breathing continues to get worse" - Nausea and vomiting with all foods; after being stuck - Taking omeprazole 40 BID and sucralfate 1 mg BID - Visit 6/5/24- Kamilah Birmingham NP - Here to follow-up with complaints of dysphagia - Awaiting pulmonary clearance to complete EGD - pt. unable to lay flat - Increased cough and SOB - Barium swallow tablet- negative- pt. not able to lay flat - Taking omeprazole 40 bid, sucralfate bid 6/26/24- Kamilah Birmingham NP - Here today to follow-up complaints of dysphagia - Mother here for office visit - patient upset there is no urgency to complete an EGD - Coughs after eating everything - Having SOB- still cannot lay flat - Talking omeprazole 40 bid and sucralfate bid

## 2024-07-09 ENCOUNTER — TELEPHONE ENCOUNTER (OUTPATIENT)
Dept: URBAN - METROPOLITAN AREA CLINIC 97 | Facility: CLINIC | Age: 54
End: 2024-07-09

## 2024-07-21 PROBLEM — 428283002: Status: ACTIVE | Noted: 2024-07-21

## 2024-07-25 ENCOUNTER — OFFICE VISIT (OUTPATIENT)
Dept: URBAN - METROPOLITAN AREA CLINIC 98 | Facility: CLINIC | Age: 54
End: 2024-07-25
Payer: COMMERCIAL

## 2024-07-25 ENCOUNTER — LAB OUTSIDE AN ENCOUNTER (OUTPATIENT)
Dept: URBAN - METROPOLITAN AREA CLINIC 98 | Facility: CLINIC | Age: 54
End: 2024-07-25

## 2024-07-25 VITALS
WEIGHT: 205 LBS | HEART RATE: 71 BPM | SYSTOLIC BLOOD PRESSURE: 119 MMHG | BODY MASS INDEX: 27.77 KG/M2 | DIASTOLIC BLOOD PRESSURE: 80 MMHG | HEIGHT: 72 IN | TEMPERATURE: 97.2 F

## 2024-07-25 DIAGNOSIS — R13.19 ESOPHAGEAL DYSPHAGIA: ICD-10-CM

## 2024-07-25 DIAGNOSIS — Z86.010 PERSONAL HISTORY OF COLONIC POLYPS: ICD-10-CM

## 2024-07-25 PROCEDURE — 99214 OFFICE O/P EST MOD 30 MIN: CPT | Performed by: INTERNAL MEDICINE

## 2024-07-25 RX ORDER — CETIRIZINE HYDROCHLORIDE 10 MG/1
1 TABLET TABLET, FILM COATED ORAL ONCE A DAY
Status: ACTIVE | COMMUNITY

## 2024-07-25 RX ORDER — LOSARTAN POTASSIUM 50 MG/1
1 TABLET TABLET ORAL ONCE A DAY
Status: ON HOLD | COMMUNITY

## 2024-07-25 RX ORDER — PROPRANOLOL HYDROCHLORIDE 40 MG/5ML
5 ML SOLUTION ORAL TWICE A DAY
Status: ACTIVE | COMMUNITY

## 2024-07-25 RX ORDER — OMEPRAZOLE 40 MG/1
TAKE ONE CAPSULE BY MOUTH TWICE A DAY 30 MINUTES BEFORE MEAL CAPSULE, DELAYED RELEASE ORAL
Qty: 180 CAPSULE | Refills: 1 | Status: ACTIVE | COMMUNITY

## 2024-07-25 NOTE — HPI-TODAY'S VISIT:
Started new  cough with last bite of food - sometimes regurgitate food  oatmeal works the best  pills are difficult now on PPI BID + carafate BID weight stable  no constipation

## 2024-08-09 ENCOUNTER — OFFICE VISIT (OUTPATIENT)
Dept: URBAN - METROPOLITAN AREA MEDICAL CENTER 10 | Facility: MEDICAL CENTER | Age: 54
End: 2024-08-09

## 2024-08-30 ENCOUNTER — TELEPHONE ENCOUNTER (OUTPATIENT)
Dept: URBAN - METROPOLITAN AREA CLINIC 98 | Facility: CLINIC | Age: 54
End: 2024-08-30

## 2024-09-03 ENCOUNTER — TELEPHONE ENCOUNTER (OUTPATIENT)
Dept: URBAN - METROPOLITAN AREA CLINIC 23 | Facility: CLINIC | Age: 54
End: 2024-09-03

## 2024-10-03 ENCOUNTER — ERX REFILL RESPONSE (OUTPATIENT)
Dept: URBAN - METROPOLITAN AREA CLINIC 98 | Facility: CLINIC | Age: 54
End: 2024-10-03

## 2024-10-03 RX ORDER — OMEPRAZOLE 40 MG/1
TAKE ONE CAPSULE BY MOUTH TWICE A DAY 30 MINUTES BEFORE MEAL CAPSULE, DELAYED RELEASE ORAL
Qty: 180 CAPSULE | Refills: 1 | OUTPATIENT

## 2024-12-19 ENCOUNTER — TELEPHONE ENCOUNTER (OUTPATIENT)
Dept: URBAN - METROPOLITAN AREA CLINIC 96 | Facility: CLINIC | Age: 54
End: 2024-12-19

## 2024-12-19 RX ORDER — SUCRALFATE 1 G/1
1 TABLET ON AN EMPTY STOMACH TABLET ORAL TWICE A DAY
Qty: 180 TABLET | Refills: 3

## 2025-04-15 ENCOUNTER — ERX REFILL RESPONSE (OUTPATIENT)
Dept: URBAN - METROPOLITAN AREA CLINIC 98 | Facility: CLINIC | Age: 55
End: 2025-04-15

## 2025-04-15 RX ORDER — OMEPRAZOLE 40 MG/1
TAKE ONE CAPSULE BY MOUTH TWICE A DAY 30 MINUTES BEFORE MEAL CAPSULE, DELAYED RELEASE ORAL
Qty: 180 CAPSULE | Refills: 1